# Patient Record
Sex: FEMALE | Race: BLACK OR AFRICAN AMERICAN
[De-identification: names, ages, dates, MRNs, and addresses within clinical notes are randomized per-mention and may not be internally consistent; named-entity substitution may affect disease eponyms.]

---

## 2019-09-12 ENCOUNTER — HOSPITAL ENCOUNTER (EMERGENCY)
Dept: HOSPITAL 35 - ER | Age: 24
Discharge: HOME | End: 2019-09-12
Payer: COMMERCIAL

## 2019-09-12 VITALS — HEIGHT: 61 IN | WEIGHT: 170 LBS | BODY MASS INDEX: 32.1 KG/M2

## 2019-09-12 VITALS — SYSTOLIC BLOOD PRESSURE: 106 MMHG | DIASTOLIC BLOOD PRESSURE: 64 MMHG

## 2019-09-12 DIAGNOSIS — N94.6: Primary | ICD-10-CM

## 2019-09-12 LAB
ANION GAP SERPL CALC-SCNC: 11 MMOL/L (ref 7–16)
BACTERIA-REFLEX: (no result) /HPF
BASOPHILS NFR BLD AUTO: 0.6 % (ref 0–2)
BILIRUB UR-MCNC: NEGATIVE MG/DL
BUN SERPL-MCNC: 8 MG/DL (ref 7–18)
CALCIUM SERPL-MCNC: 8.8 MG/DL (ref 8.5–10.1)
CHLORIDE SERPL-SCNC: 104 MMOL/L (ref 98–107)
CO2 SERPL-SCNC: 25 MMOL/L (ref 21–32)
COLOR UR: YELLOW
CREAT SERPL-MCNC: 0.8 MG/DL (ref 0.6–1)
EOSINOPHIL NFR BLD: 0.8 % (ref 0–3)
ERYTHROCYTE [DISTWIDTH] IN BLOOD BY AUTOMATED COUNT: 14.8 % (ref 10.5–14.5)
GLUCOSE SERPL-MCNC: 95 MG/DL (ref 74–106)
GRANULOCYTES NFR BLD MANUAL: 64.4 % (ref 36–66)
HCT VFR BLD CALC: 33.8 % (ref 37–47)
HGB BLD-MCNC: 10.7 GM/DL (ref 12–15)
KETONES UR STRIP-MCNC: NEGATIVE MG/DL
LG PLATELETS BLD QL SMEAR: (no result)
LYMPHOCYTES NFR BLD AUTO: 23.4 % (ref 24–44)
MCH RBC QN AUTO: 26.7 PG (ref 26–34)
MCHC RBC AUTO-ENTMCNC: 31.5 G/DL (ref 28–37)
MCV RBC: 84.7 FL (ref 80–100)
MONOCYTES NFR BLD: 10.8 % (ref 1–8)
NEUTROPHILS # BLD: 6 THOU/UL (ref 1.4–8.2)
PLATELET # BLD: 364 THOU/UL (ref 150–400)
POTASSIUM SERPL-SCNC: 3.6 MMOL/L (ref 3.5–5.1)
RBC # BLD AUTO: 3.99 MIL/UL (ref 4.2–5)
RBC # UR STRIP: (no result) /UL
RBC #/AREA URNS HPF: (no result) /HPF (ref 0–2)
SODIUM SERPL-SCNC: 140 MMOL/L (ref 136–145)
SP GR UR STRIP: 1.01 (ref 1–1.03)
SQUAMOUS: (no result) /LPF (ref 0–3)
URINE CLARITY: (no result)
URINE GLUCOSE-RANDOM*: NEGATIVE
URINE LEUKOCYTES-REFLEX: NEGATIVE
URINE NITRITE-REFLEX: NEGATIVE
URINE PROTEIN (DIPSTICK): (no result)
URINE WBC-REFLEX: (no result) /HPF (ref 0–5)
UROBILINOGEN UR STRIP-ACNC: 1 E.U./DL (ref 0.2–1)
WBC # BLD AUTO: 9.3 THOU/UL (ref 4–11)

## 2020-08-03 ENCOUNTER — HOSPITAL ENCOUNTER (INPATIENT)
Dept: HOSPITAL 35 - ER | Age: 25
LOS: 2 days | Discharge: HOME | DRG: 674 | End: 2020-08-05
Attending: HOSPITALIST | Admitting: HOSPITALIST
Payer: COMMERCIAL

## 2020-08-03 VITALS — SYSTOLIC BLOOD PRESSURE: 92 MMHG | DIASTOLIC BLOOD PRESSURE: 66 MMHG

## 2020-08-03 VITALS
DIASTOLIC BLOOD PRESSURE: 71 MMHG | WEIGHT: 164 LBS | HEIGHT: 60.98 IN | SYSTOLIC BLOOD PRESSURE: 148 MMHG | BODY MASS INDEX: 30.96 KG/M2

## 2020-08-03 VITALS — SYSTOLIC BLOOD PRESSURE: 108 MMHG | DIASTOLIC BLOOD PRESSURE: 56 MMHG

## 2020-08-03 VITALS — DIASTOLIC BLOOD PRESSURE: 45 MMHG | SYSTOLIC BLOOD PRESSURE: 94 MMHG

## 2020-08-03 DIAGNOSIS — S30.1XXA: ICD-10-CM

## 2020-08-03 DIAGNOSIS — N94.6: ICD-10-CM

## 2020-08-03 DIAGNOSIS — M60.009: ICD-10-CM

## 2020-08-03 DIAGNOSIS — Z79.899: ICD-10-CM

## 2020-08-03 DIAGNOSIS — Z20.828: ICD-10-CM

## 2020-08-03 DIAGNOSIS — N30.90: Primary | ICD-10-CM

## 2020-08-03 LAB
ALBUMIN SERPL-MCNC: 4.1 G/DL (ref 3.4–5)
ALT SERPL-CCNC: 19 U/L (ref 30–65)
ANION GAP SERPL CALC-SCNC: 11 MMOL/L (ref 7–16)
AST SERPL-CCNC: 11 U/L (ref 15–37)
BACTERIA-REFLEX: (no result) /HPF
BASOPHILS NFR BLD AUTO: 0.4 % (ref 0–2)
BILIRUB SERPL-MCNC: 0.5 MG/DL (ref 0.2–1)
BILIRUB UR-MCNC: NEGATIVE MG/DL
BUN SERPL-MCNC: 10 MG/DL (ref 7–18)
CALCIUM SERPL-MCNC: 9 MG/DL (ref 8.5–10.1)
CHLORIDE SERPL-SCNC: 103 MMOL/L (ref 98–107)
CO2 SERPL-SCNC: 25 MMOL/L (ref 21–32)
COLOR UR: YELLOW
CREAT SERPL-MCNC: 0.7 MG/DL (ref 0.6–1)
EOSINOPHIL NFR BLD: 0.9 % (ref 0–3)
ERYTHROCYTE [DISTWIDTH] IN BLOOD BY AUTOMATED COUNT: 16 % (ref 10.5–14.5)
GLUCOSE SERPL-MCNC: 92 MG/DL (ref 74–106)
GRANULOCYTES NFR BLD MANUAL: 77.1 % (ref 36–66)
HCT VFR BLD CALC: 37.6 % (ref 37–47)
HGB BLD-MCNC: 12.2 GM/DL (ref 12–15)
KETONES UR STRIP-MCNC: NEGATIVE MG/DL
LIPASE: 104 U/L (ref 73–393)
LYMPHOCYTES NFR BLD AUTO: 12.5 % (ref 24–44)
MCH RBC QN AUTO: 28.8 PG (ref 26–34)
MCHC RBC AUTO-ENTMCNC: 32.4 G/DL (ref 28–37)
MCV RBC: 89 FL (ref 80–100)
MONOCYTES NFR BLD: 9.1 % (ref 1–8)
MUCUS: (no result) STRN/LPF
NEUTROPHILS # BLD: 9 THOU/UL (ref 1.4–8.2)
OPIATES UR-MCNC: POSITIVE NG/ML
PLATELET # BLD: 321 THOU/UL (ref 150–400)
POTASSIUM SERPL-SCNC: 3.7 MMOL/L (ref 3.5–5.1)
PROT SERPL-MCNC: 8.3 G/DL (ref 6.4–8.2)
RBC # BLD AUTO: 4.23 MIL/UL (ref 4.2–5)
RBC # UR STRIP: (no result) /UL
RBC #/AREA URNS HPF: (no result) /HPF (ref 0–2)
SODIUM SERPL-SCNC: 139 MMOL/L (ref 136–145)
SP GR UR STRIP: 1.02 (ref 1–1.03)
SQUAMOUS: (no result) /LPF (ref 0–3)
URINE CLARITY: CLEAR
URINE GLUCOSE-RANDOM*: NEGATIVE
URINE LEUKOCYTES-REFLEX: (no result)
URINE NITRITE-REFLEX: POSITIVE
URINE PROTEIN (DIPSTICK): NEGATIVE
URINE WBC-REFLEX: (no result) /HPF (ref 0–5)
UROBILINOGEN UR STRIP-ACNC: 0.2 E.U./DL (ref 0.2–1)
WBC # BLD AUTO: 11.7 THOU/UL (ref 4–11)

## 2020-08-03 PROCEDURE — 50010 RENAL EXPLORATION: CPT

## 2020-08-03 PROCEDURE — 56525: CPT

## 2020-08-03 PROCEDURE — 10040 EXTRACTION: CPT

## 2020-08-03 PROCEDURE — 50403: CPT

## 2020-08-03 PROCEDURE — 50386 REMOVE STENT VIA TRANSURETH: CPT

## 2020-08-03 NOTE — O
[facility]
Marlen Ramirez
[place]   [address]                     OPERATIVE REPORT              
_______________________________________________________________________________
 
Name:       LEIGH PAUL                  Room #:         458-P       ADM IN  
M.R.#:      [id number]                       Account #:      [id number]  
Admission:  20    Attend Phys:    Matt Pandey MD     
Discharge:              Date of Birth:  [birth year]  
                                                          Report #: [id number]
                                                                    [id number]   
_______________________________________________________________________________
THIS REPORT FOR:  
 
cc:  [facility] - Clinic physician unknown
     [facility] - [facility] physician unknown                                       
     Attila Prajapati MD                                     ~
CC: [facility] unknown
    Matt Pandey
 
DATE OF SERVICE:  2020
 
 
PREOPERATIVE DIAGNOSIS:  Left lower abdominal wall fluid collection.
 
POSTOPERATIVE DIAGNOSIS:  Lower abdominal wall hematoma.
 
OPERATION:  Incision and drainage of left lower abdominal wall hematoma.
 
SURGEON:  Attila Prajapati MD
 
ANESTHESIA:  General.
 
ESTIMATED BLOOD LOSS:  5 mL.
 
SPECIMEN:  None.
 
DESCRIPTION OF PROCEDURE:  After informed consent was obtained, the patient was
brought to the operating room and placed supine.  SCDs were placed and working,
preoperative antibiotics were administered, general anesthesia was induced.  The
abdomen was prepped and draped in the usual sterile fashion.  A 3-cm incision
was made over her previous  incision in the left lower quadrant of the
abdomen.  Cautery dissection was made down to the rectus sheath.  Rectus sheath
was incised.  Immediately old blood was removed.  This was a hematoma.  The
blood was then suctioned out.  The cavity was irrigated with normal saline.  The
fascia was reapproximated with a figure-of-eight 0 Vicryl.  The wound was then
left packed with iodoform gauze.  Sterile dressings were applied.
 
COMPLICATIONS:  None.
 
DISPOSITION:  The patient was taken to recovery in satisfactory condition.
 
 
 
 
 
                         
   By:                               
                   
D: 20 1342                           _____________________________________
T: 20 1355                           Attila Prajapati MD       /nt

## 2020-08-03 NOTE — EMS
[facility]
[address] [place]   [address]                     EMS Patient Care Report       
_______________________________________________________________________________
 
Name:       LEIGH PAUL                  Room #:                     PRE NARCISO  
M.VANNESA#:      [id number]                       Account #:      [id number]  
Admission:              Attend Phys:                          
Discharge:              Date of Birth:  [birth year]  
                                                          Report #: [id number]
                                                                    [id number]
_______________________________________________________________________________
THIS REPORT FOR:   //name//                          
 
Report Transmitted: 2020 09:11
EMS Care Summary
[facility]
Incident [id number] @ 2020 09:23
 
Incident Location
[address] [place] [address]
 
Patient
LEIGH PAUL
Female, 25 Years
 1995
 
Patient Address
[address] [place] [address]
 
Patient History
Ovarian Cysts,
 
Patient Allergies
No known allergies,
 
Patient Medications
None Reported,
 
Chief Complaint
abdominal and leg pain
 
Disposition
Transported No Lights/[place]
 
Dispatch Reason
Sick Person
 
Transported To
[facility]
 
Narrative
The pt said she developed abdominal pain yesterday morning.  She said the pain 
has been "sharp" and it increased over the last 24 hours.  She said today, she 
 
 
 
[facility]
[address] [place]   [address]                     EMS Patient Care Report       
_______________________________________________________________________________
 
Name:       LEIGH PAUL                  Room #:                     PRE   
SUSANA#:      [id number]                       Account #:      [id number]  
Admission:              Attend Phys:                          
Discharge:              Date of Birth:  [birth year]  
                                                          Report #: [id number]
                                                                    [id number]
_______________________________________________________________________________
also was having pain in both legs.  She said she was diagnosed with an "ovarian 
cyst" about 2 years ago and she said the pain felt similar.  The pt said she 
has been nauseated today off and on, but has not vomited.  She denied diarrhea, 
fever, cough, blood in the stool, shortness of breath or headache.  She said 
she hasn't been around anyone with COVID-19 recently or traveled.  The pt was 
unable to rate her pain other than saying "it hurts."  The pt said she had no 
possibility of pregnancy. 
 
The pt was sitting on the floor inside her apartment.  HPI, PMH, Exam, vitals.  
The pt walked to the ambulance and was secured in the capt's chair.  Enroute:  
[facility].  Biocom to [place].  No orders req'd/rec'd.  The pt walked to the ER 
and ER staff helped her into the bathroom for a urine sample.  Report given to 
RN. 
 
Initial Vitals
@09:40P: 62,R: 16,BP: 136/85,Pain: 5/10,GCS: 15,SpO2: 98,Revised Trauma: 12,
@09:43P: 70,R: 18,Pain: 4/10,GCS: 15,SpO2: 98,
 
Assessments
@09:38MENTAL:Person Oriented,Time Oriented,Place Oriented,Event 
Oriented,SKIN:HEENT:Head/Face: No Abnormalities,Neck/Airway: No 
Abnormalities,LUNG SOUNDS:General: Nausea,General: Other,ABDOMEN:General: 
Nausea,General: Other,PELVIS//GI:No Abnormalities,EXTREMITIES:Right Leg: 
Abnormal Sensation,Left Leg: Abnormal Sensation,Left Arm: No 
Abnormalities,Right Arm: No Abnormalities,PULSE:NEURO:No Abnormalities, 
 
Impression
Abdominal Pain
 
 
Timeline
09:21,Call Received
09:21,Psap Call
09:23,Dispatched
09:23,En Route
09:31,On Scene
09:33,At Patient
09:36,Depart Scene
09:40,BP: 136/85 M,PULSE: 62,RR: 16 R,SPO2: 98 Ox,ETCO2:  ,BG: ,PAIN: 5,GCS: 15,
09:43,BP: / M,PULSE: 70,RR: 18 R,SPO2: 98 Ox,ETCO2:  ,BG: ,PAIN: 4,GCS: 15,
09:44,At Destination
09:53,Call Closed
 
Disclaimer
v1.1     Copyright 2020 [employer]
This EMS Care Summary contains data elements from the applicable legal record 
 
 
 
[address]   [address]                     EMS Patient Care Report       
_______________________________________________________________________________
 
Name:       LEIGH PAUL                  Room #:                     PRE   
M.R.#:      [id number]                       Account #:      [id number]  
Admission:              Attend Phys:                          
Discharge:              Date of Birth:  [birth year]  
                                                          Report #: [id number]
                                                                    [id number]
_______________________________________________________________________________
(which may be displayed differently). It is designed to provide pertinent 
information for the following purposes: continuity of care, clinical quality, 
and state data reporting. The complete legal record is available to ED staff 
and administrators of the receiving hospital in [employer]'s Patient Tracker. All data 
is provided "as is."

## 2020-08-04 VITALS — DIASTOLIC BLOOD PRESSURE: 85 MMHG | SYSTOLIC BLOOD PRESSURE: 121 MMHG

## 2020-08-04 VITALS — SYSTOLIC BLOOD PRESSURE: 117 MMHG | DIASTOLIC BLOOD PRESSURE: 82 MMHG

## 2020-08-04 VITALS — SYSTOLIC BLOOD PRESSURE: 121 MMHG | DIASTOLIC BLOOD PRESSURE: 65 MMHG

## 2020-08-04 VITALS — SYSTOLIC BLOOD PRESSURE: 108 MMHG | DIASTOLIC BLOOD PRESSURE: 56 MMHG

## 2020-08-04 VITALS — DIASTOLIC BLOOD PRESSURE: 49 MMHG | SYSTOLIC BLOOD PRESSURE: 98 MMHG

## 2020-08-04 VITALS — SYSTOLIC BLOOD PRESSURE: 112 MMHG | DIASTOLIC BLOOD PRESSURE: 63 MMHG

## 2020-08-04 LAB
ANION GAP SERPL CALC-SCNC: 8 MMOL/L (ref 7–16)
BUN SERPL-MCNC: 6 MG/DL (ref 7–18)
CALCIUM SERPL-MCNC: 7.8 MG/DL (ref 8.5–10.1)
CHLORIDE SERPL-SCNC: 106 MMOL/L (ref 98–107)
CO2 SERPL-SCNC: 26 MMOL/L (ref 21–32)
CREAT SERPL-MCNC: 0.7 MG/DL (ref 0.6–1)
ERYTHROCYTE [DISTWIDTH] IN BLOOD BY AUTOMATED COUNT: 15.9 % (ref 10.5–14.5)
GLUCOSE SERPL-MCNC: 71 MG/DL (ref 74–106)
HCT VFR BLD CALC: 31.5 % (ref 37–47)
HGB BLD-MCNC: 10.2 GM/DL (ref 12–15)
MCH RBC QN AUTO: 29 PG (ref 26–34)
MCHC RBC AUTO-ENTMCNC: 32.5 G/DL (ref 28–37)
MCV RBC: 89.3 FL (ref 80–100)
PLATELET # BLD: 231 THOU/UL (ref 150–400)
POTASSIUM SERPL-SCNC: 3.3 MMOL/L (ref 3.5–5.1)
RBC # BLD AUTO: 3.52 MIL/UL (ref 4.2–5)
SODIUM SERPL-SCNC: 140 MMOL/L (ref 136–145)
WBC # BLD AUTO: 7.5 THOU/UL (ref 4–11)

## 2020-08-04 PROCEDURE — 0W9F0ZZ DRAINAGE OF ABDOMINAL WALL, OPEN APPROACH: ICD-10-PCS

## 2020-08-04 NOTE — NUR
PT ADMITTED RELATED TO INTERMUSCULAR ABCSESS. CM REVIEWED CHART AND SPOKE WITH
CARE TEAM. CM ATTEMPTED MULTIPAL PHONE CALLS TO PT'S ROOM WIHT NO RESPONSE AS
OF THIS NOTE. CHART INDICATES THAT PT RESIDES IN AN APARTMENT WITH HER
CHILDREN. PT UNDERWENT I&D OF INTERMUSCULAR ABCSESS THIS AFTERNOON. PT IS ON
TWO IV ABX AT THIS TIME. PT IS LISTED AS PATIENT PAY. CM TO CONTINUE TO FOLLOW
UP WITH PT TO ASSESS FOR ANY POSSIBLE NEEDS UPON DC. CM TO FOLLOW AS INDICATED
WITH DC PLANNING.

## 2020-08-04 NOTE — NUR
ADMITTED TO THE UNIT AT AT APPROXIMATELY 2330. PT IS A/O AND UP AD J LUIS. ABLE
TO MAKE NEEDS KNOWN. C/O PAIN IN LOWER ABDOMEN. PRN PAIN MEDICATION WILL BE
GIVEN AS DIRECTED. CONSENTS SIGNED, HX AND ASSESSMENT COMPLETED AND
DOCUMENTED. CALL LIGHT IS WITHIN REACH. WILL CONTINUE TO MONITOR.

## 2020-08-04 NOTE — NUR
Received awake on bed. On nothing per orem- pt informed and aware. A+Ox4. On
MS. On room air. Vital signs stable. Continent of bowel and bladder- able to
go to the toilet. Up ad horace. Independent with ADLs. With NS at 75cc/hr,
infusing well at L AC; on IV antibiotics. Consults called in by US.
Complained of pain, due PRN pain meds given as prescribed.
Possible surgery today, a/w time and report to be given to Pre-op nurse.
Pre op nurse informed still a/w covid swab result, report given; consent to be
signed.
Pt fetched by pre op transport staff via bed.
To continue monitoring patient.

## 2020-08-05 VITALS — SYSTOLIC BLOOD PRESSURE: 124 MMHG | DIASTOLIC BLOOD PRESSURE: 54 MMHG

## 2020-08-05 VITALS — DIASTOLIC BLOOD PRESSURE: 54 MMHG | SYSTOLIC BLOOD PRESSURE: 124 MMHG

## 2020-08-05 LAB
ANION GAP SERPL CALC-SCNC: 10 MMOL/L (ref 7–16)
BUN SERPL-MCNC: 7 MG/DL (ref 7–18)
CALCIUM SERPL-MCNC: 8.4 MG/DL (ref 8.5–10.1)
CHLORIDE SERPL-SCNC: 104 MMOL/L (ref 98–107)
CO2 SERPL-SCNC: 24 MMOL/L (ref 21–32)
CREAT SERPL-MCNC: 0.6 MG/DL (ref 0.6–1)
ERYTHROCYTE [DISTWIDTH] IN BLOOD BY AUTOMATED COUNT: 15.7 % (ref 10.5–14.5)
GLUCOSE SERPL-MCNC: 99 MG/DL (ref 74–106)
HCT VFR BLD CALC: 31 % (ref 37–47)
HGB BLD-MCNC: 10.3 GM/DL (ref 12–15)
MCH RBC QN AUTO: 29.4 PG (ref 26–34)
MCHC RBC AUTO-ENTMCNC: 33.1 G/DL (ref 28–37)
MCV RBC: 88.6 FL (ref 80–100)
PLATELET # BLD: 241 THOU/UL (ref 150–400)
POTASSIUM SERPL-SCNC: 3.9 MMOL/L (ref 3.5–5.1)
RBC # BLD AUTO: 3.5 MIL/UL (ref 4.2–5)
SODIUM SERPL-SCNC: 138 MMOL/L (ref 136–145)
WBC # BLD AUTO: 10.9 THOU/UL (ref 4–11)

## 2020-08-05 NOTE — NUR
CARE TEAM INDICATED THAT PT IS MEDICALLY STABLE TO DC HOME THIS DAY. PT IS TO
DC HOME TO SELF CARE. NO OTHER CM INTERVENTION INDICATED. CASE CLOSED.

## 2020-08-05 NOTE — NUR
ASSUMED CARE OF PT AT 1900. PT IS A/O X4. C/O PAIN. PRN PAIN MEDICATION GIVEN.
DRESSING TO ABDOMEN IS CLEAN, DRY, AND INTACT WITH NO DRAINAGE. PT IS
CURRENTLY IN HER BED AND APPEARS TO BE WATCHING TV. IV TO LEFT AC REMOVED WITH
CATHETER INTACT. NEW IV PLACED. CALL LIGHT IS WITHIN REACH. WILL CONTINUE TO
MONITOR.

## 2020-08-05 NOTE — NUR
ASSESSMENT AS CHARTED - MEDS AS PER MAR -  SHALINI DIET AND FLUIDS.  GIVEN
FENTANYL 50 MCS X 1 DOSE FOR CO'S OF PAIN WITH MOD EFFECT.  PT UP TO THE
BATHROOM,  DRESSING CHANGED AS PER DR BURCH ORDER AND PATIENT EDUCATED ON
HOW TO CHANGE DRESSING AT HOME AND GIVEN SUPPLIES TO BE ABLE TO DO THIS DAILY.
 STATED UNDERSTANDING OF HOW TO CHANGE DRESSING.  PT TO FOLLOW P WITH SURGEON
IN 1 WEEK HE STATED THEY WOULD CALL HER FOR APPT. PT HOME THIS AFTERNOON
INSTRUCTION RE HOME MEDS. CARE AND FOLLOW UP GIVEN TO PATIENT,   STATED
UNDERSTANDING OF INSTRUCTION GIVEN.  PATEINT GIVEN A WORK EXCUSE THAT ALLOWS
HE BACK TO WORK IN WEEK AS PER DR BURCH.  IV REMOVED PRIOR TO D/C.  LEFT
UNIT VIA WHEELCHAIR, HOME VIA PVT VEHICLE ACCOMPANIED BY MOTHER.  NO CO'S AT
TIME OF D/C.